# Patient Record
Sex: MALE | Race: WHITE | NOT HISPANIC OR LATINO | Employment: FULL TIME | URBAN - METROPOLITAN AREA
[De-identification: names, ages, dates, MRNs, and addresses within clinical notes are randomized per-mention and may not be internally consistent; named-entity substitution may affect disease eponyms.]

---

## 2017-07-19 ENCOUNTER — TELEPHONE (OUTPATIENT)
Dept: GASTROENTEROLOGY | Facility: CLINIC | Age: 32
End: 2017-07-19

## 2017-07-19 NOTE — TELEPHONE ENCOUNTER
----- Message from Latrice Lagos sent at 7/19/2017  2:14 PM CDT -----  Contact: Dr. Raudel Martins  Good afternoon, Dr. Martins would like to refer the following patient to Dr. Manley in the gastroenterology department. The patients diagnosis is abdominal pain, diarrhea, and family h/o of ulcerative colitis. I have scanned the patients referral and records into media manager. Please let me know if Dr. Manley will see this diagnosis. If there are any further questions in regards to the patient, please contact Dr. Martins's referral navigator, Kaur, at [phone]. Also, my extension is [phone]. Please let me know if I can help schedule in any way. Thank you!

## 2017-07-19 NOTE — TELEPHONE ENCOUNTER
Called and spoke with Kaur Martins's office  I asked if pt has a DX of UC or Crohn's.She said no she does not believe so it is a family history of UC. I explained Dr Manley generally only see's pts that have been DX with UC or Crohn's. I told her we do have general GI physicians in office and asked if that would be ok and she said she believes the reason they were referring was at pts request.  I told her I would reach out to the pt and she expressed understanding     After hanging up with Kaur, Manda stated she had already spoken with Latrice and told her to send it to us and we would review.  Will review records before calling pt

## 2017-10-04 ENCOUNTER — LAB VISIT (OUTPATIENT)
Dept: LAB | Facility: HOSPITAL | Age: 32
End: 2017-10-04
Attending: INTERNAL MEDICINE
Payer: COMMERCIAL

## 2017-10-04 ENCOUNTER — OFFICE VISIT (OUTPATIENT)
Dept: GASTROENTEROLOGY | Facility: CLINIC | Age: 32
End: 2017-10-04
Payer: COMMERCIAL

## 2017-10-04 VITALS
DIASTOLIC BLOOD PRESSURE: 86 MMHG | BODY MASS INDEX: 25.01 KG/M2 | SYSTOLIC BLOOD PRESSURE: 146 MMHG | HEIGHT: 73 IN | WEIGHT: 188.69 LBS | HEART RATE: 85 BPM

## 2017-10-04 DIAGNOSIS — G89.29 CHRONIC ABDOMINAL PAIN: ICD-10-CM

## 2017-10-04 DIAGNOSIS — G89.29 CHRONIC ABDOMINAL PAIN: Primary | ICD-10-CM

## 2017-10-04 DIAGNOSIS — R10.9 CHRONIC ABDOMINAL PAIN: ICD-10-CM

## 2017-10-04 DIAGNOSIS — R10.9 CHRONIC ABDOMINAL PAIN: Primary | ICD-10-CM

## 2017-10-04 LAB
ALBUMIN SERPL BCP-MCNC: 4.1 G/DL
ALP SERPL-CCNC: 79 U/L
ALT SERPL W/O P-5'-P-CCNC: 20 U/L
ANION GAP SERPL CALC-SCNC: 10 MMOL/L
AST SERPL-CCNC: 21 U/L
BASOPHILS # BLD AUTO: 0.07 K/UL
BASOPHILS NFR BLD: 1.2 %
BILIRUB SERPL-MCNC: 0.4 MG/DL
BUN SERPL-MCNC: 11 MG/DL
CALCIUM SERPL-MCNC: 9.6 MG/DL
CHLORIDE SERPL-SCNC: 104 MMOL/L
CO2 SERPL-SCNC: 27 MMOL/L
CREAT SERPL-MCNC: 1.1 MG/DL
CRP SERPL-MCNC: 0.5 MG/L
DIFFERENTIAL METHOD: NORMAL
EOSINOPHIL # BLD AUTO: 0.2 K/UL
EOSINOPHIL NFR BLD: 3.7 %
ERYTHROCYTE [DISTWIDTH] IN BLOOD BY AUTOMATED COUNT: 12 %
ERYTHROCYTE [SEDIMENTATION RATE] IN BLOOD BY WESTERGREN METHOD: 1 MM/HR
EST. GFR  (AFRICAN AMERICAN): >60 ML/MIN/1.73 M^2
EST. GFR  (NON AFRICAN AMERICAN): >60 ML/MIN/1.73 M^2
GLUCOSE SERPL-MCNC: 88 MG/DL
HCT VFR BLD AUTO: 45.2 %
HGB BLD-MCNC: 15.7 G/DL
LYMPHOCYTES # BLD AUTO: 1.7 K/UL
LYMPHOCYTES NFR BLD: 28.4 %
MCH RBC QN AUTO: 30.5 PG
MCHC RBC AUTO-ENTMCNC: 34.7 G/DL
MCV RBC AUTO: 88 FL
MONOCYTES # BLD AUTO: 0.6 K/UL
MONOCYTES NFR BLD: 9.6 %
NEUTROPHILS # BLD AUTO: 3.4 K/UL
NEUTROPHILS NFR BLD: 56.9 %
PLATELET # BLD AUTO: 234 K/UL
PMV BLD AUTO: 10.2 FL
POTASSIUM SERPL-SCNC: 4.3 MMOL/L
PROT SERPL-MCNC: 7.5 G/DL
RBC # BLD AUTO: 5.15 M/UL
SODIUM SERPL-SCNC: 141 MMOL/L
WBC # BLD AUTO: 5.92 K/UL

## 2017-10-04 PROCEDURE — 99205 OFFICE O/P NEW HI 60 MIN: CPT | Mod: S$GLB,,, | Performed by: INTERNAL MEDICINE

## 2017-10-04 PROCEDURE — 80053 COMPREHEN METABOLIC PANEL: CPT

## 2017-10-04 PROCEDURE — 85025 COMPLETE CBC W/AUTO DIFF WBC: CPT

## 2017-10-04 PROCEDURE — 99999 PR PBB SHADOW E&M-EST. PATIENT-LVL III: CPT | Mod: PBBFAC,,, | Performed by: INTERNAL MEDICINE

## 2017-10-04 PROCEDURE — 85651 RBC SED RATE NONAUTOMATED: CPT

## 2017-10-04 PROCEDURE — 83516 IMMUNOASSAY NONANTIBODY: CPT

## 2017-10-04 PROCEDURE — 36415 COLL VENOUS BLD VENIPUNCTURE: CPT

## 2017-10-04 PROCEDURE — 86140 C-REACTIVE PROTEIN: CPT

## 2017-10-04 RX ORDER — DIPHENOXYLATE HYDROCHLORIDE AND ATROPINE SULFATE 2.5; .025 MG/1; MG/1
1 TABLET ORAL
COMMUNITY

## 2017-10-04 RX ORDER — MONTELUKAST SODIUM 10 MG/1
10 TABLET ORAL
COMMUNITY

## 2017-10-04 RX ORDER — CLONAZEPAM 0.5 MG/1
0.5 TABLET ORAL DAILY
COMMUNITY

## 2017-10-04 RX ORDER — BUPROPION HYDROCHLORIDE 75 MG/1
75 TABLET ORAL 2 TIMES DAILY
COMMUNITY

## 2017-10-04 NOTE — LETTER
October 4, 2017      Raudel Martins,   [address] [place] [address] [place] [address]           [facility] - Gastroenterology  [address]  [place] [address]  Phone: [phone]  Fax: [phone]          Patient: Georgi Alfred   MR Number: [id number]   YOB: 1985   Date of Visit: 10/4/2017       Dear Dr. Raudel Martins:    Thank you for referring Georgi Alfred to me for evaluation. Attached you will find relevant portions of my assessment and plan of care.    If you have questions, please do not hesitate to call me. I look forward to following Georgi Alfred along with you.    Sincerely,    Valentin Robles MD    Enclosure  CC:  No Recipients    If you would like to receive this communication electronically, please contact [email or web address] or [phone] to request more information on [employer] Link access.    For providers and/or their staff who would like to refer a patient to [facility], please contact us through our one-stop-shop provider referral line, [facility], at [phone].    If you feel you have received this communication in error or would no longer like to receive these types of communications, please e-mail [email or web address]

## 2017-10-04 NOTE — PROGRESS NOTES
Subjective:       Patient ID: Georgi Alfred is a 31 y.o. male.    Chief Complaint: GI Problem    HPI  Review of Systems   Constitutional: Positive for fatigue. Negative for activity change, appetite change, chills, diaphoresis, fever and unexpected weight change.   HENT: Positive for congestion and mouth sores. Negative for ear pain, rhinorrhea, sinus pressure, sneezing, sore throat, trouble swallowing and voice change.    Eyes: Negative for pain.   Respiratory: Negative for cough and shortness of breath.    Cardiovascular: Negative for chest pain, palpitations and leg swelling.   Genitourinary: Negative for difficulty urinating and flank pain.   Musculoskeletal: Positive for back pain and myalgias. Negative for arthralgias, gait problem, joint swelling and neck pain.   Skin: Negative for rash.   Neurological: Negative for dizziness, tremors, syncope, numbness and headaches.   Hematological: Negative for adenopathy. Does not bruise/bleed easily.   Psychiatric/Behavioral: Positive for sleep disturbance. Negative for agitation, behavioral problems, confusion, decreased concentration and dysphoric mood.       Objective:      Physical Exam   Constitutional: He is oriented to person, place, and time. He appears well-developed and well-nourished. No distress.   HENT:   Right Ear: External ear normal.   Left Ear: External ear normal.   Nose: Nose normal.   Mouth/Throat: Oropharynx is clear and moist. No oropharyngeal exudate.   Eyes: Conjunctivae are normal. Pupils are equal, round, and reactive to light. No scleral icterus.   Neck: Normal range of motion. Neck supple. No thyromegaly present.   Cardiovascular: Normal rate, normal heart sounds and intact distal pulses.  Exam reveals no gallop.    No murmur heard.  Pulmonary/Chest: Effort normal and breath sounds normal. He has no wheezes.   Abdominal: Soft. Normal appearance and bowel sounds are normal. He exhibits no distension, no fluid wave, no ascites and no mass.  There is no hepatosplenomegaly. There is tenderness in the right lower quadrant. There is no rigidity, no rebound, no guarding and no CVA tenderness.       Genitourinary:   Genitourinary Comments: recent   Musculoskeletal: Normal range of motion. He exhibits no edema or tenderness.   Lymphadenopathy:     He has no cervical adenopathy.   Neurological: He is alert and oriented to person, place, and time. He has normal reflexes. No cranial nerve deficit.   Skin: Skin is warm. No rash noted. He is not diaphoretic.   Psychiatric: He has a normal mood and affect. His behavior is normal. Thought content normal.       Assessment:       1. Chronic abdominal pain        Plan:         HISTORY OF PRESENT ILLNESS:  Georgi Alfred is here with his wife.  They are   from [place].  They desired a second or third opinion.  It was certainly my   pleasure to see him today.  I was able to review a large volume of medical   records.    He says he has had stomach issues even as a teenager, but they were not severe   or incapacitating.  In 2010, he was diagnosed with anxiety and treated with   various ways, but over the past several years is when the abdominal symptoms   have increased.  He complains of abdominal pain.  This is a diffuse mid   abdominal pain, although there seems to be some concentration in the right lower   quadrant.  When it does start anywhere, it seems to be most focused there.  He   has it everyday, although the intensity is variable.  On bad days, he lies down   and cannot go to work.  It does seem to be progressive over time.  He is unable   to identify any specific foods or activities that make the pain worse.    Likewise, there are no things that make it better.  He does have off and   frequent gas and gas pain.  He often has diarrhea as well.  He does not have   diarrhea every day.  Some days, he will have no bowel movements.  It sounds like   most days, he will have loose stools.  Often, there is a lot of  urgency and   sometimes some incomplete evacuation.  He never has nocturnal diarrhea, but the   pain can wake him up at night.    REVIEW OF SYSTEMS:  Significant for fatigue and mouth ulcers.  The mouth ulcers   are particularly symptomatic.    PAST MEDICATION TRIALS:  1.  Levsin, no help, Librax that helped with the pain, but made anxiety worse.  2.  Lomotil, helps with diarrhea.  3.  Bentyl, no help.  4.  Pepto.  5.  Imodium.  6.  Peppermint oil.  7.  Probiotics.    PAST MEDICATION WORKUP:  1.  ESR of 69 in 2013.  I am not sure why this was drawn.  2.  Colonoscopy in June 2016, done by Dr. Scherer.  The exam was to the   cecum.  They did not visualize the terminal ileum.  The colon was reported   normal.  3.  EGD in June 2016, this was normal.  Duodenal biopsies were obtained.  I do   not have those results.  I am assuming they were normal.  4.  CT scan in 2015, essentially normal.    SOCIAL HISTORY:  .  He works as a .  Diet is variable.  He likes   to be active, but this has been limiting his activities like mountain biking and   kayaking.    ASSESSMENT AND DECISION MAKING:  Chronic abdominal pain.  I think this has been   diagnosed as irritable bowel syndrome and this may well be IBS-D.  However, I am   not completely satisfied that Crohn's disease has been excluded.  With the pain   that wakes him up at night, the fullness and tenderness in the right lower   quadrant and the mouth ulcers, that still concerns me that he might have small   bowel Crohn's disease.  I have recommended that we do labs today and then do a   CT enterography.  If there are any abnormalities after that, I will consider   either a colonoscopy with intubation of the terminal ileum and/or video capsule   to look at the rest of the small bowel.  We will try and start with noninvasive   tests early.  We did not talk about treatment of Crohn's disease at this time.    We did talk about other options for IBS.  If the Crohn's  workup is negative then   there are some options we can try for IBS.  We may try and switch his   antidepressive medicines.  He is not convinced Wellbutrin really helps him.  I   would like to use tricyclic agents in low-dose for IBS.  I wonder if Viberzi   might help him.  We could even pursue SIBO along the IBS lines.    RECOMMENDATIONS:  1.  Labs:  CBC, CMP, CRP, ESR and TTG.  2.  CT enterography, take from there.      REBECCA/IN  dd: 10/04/2017 16:46:20 (CDT)  td: 10/05/2017 13:19:01 (CDT)  Doc ID   #[id number]  Job ID #[id number]    CC:

## 2017-10-05 ENCOUNTER — TELEPHONE (OUTPATIENT)
Dept: GASTROENTEROLOGY | Facility: CLINIC | Age: 32
End: 2017-10-05

## 2017-10-06 LAB — TTG IGA SER IA-ACNC: 5 UNITS

## 2017-10-09 ENCOUNTER — TELEPHONE (OUTPATIENT)
Dept: GASTROENTEROLOGY | Facility: CLINIC | Age: 32
End: 2017-10-09

## 2017-10-09 NOTE — TELEPHONE ENCOUNTER
----- Message from Shruthi Hernandez sent at 10/9/2017  9:39 AM CDT -----  Contact: self - [phone]  Travis - has questions re his appt on Wednesday for his ct scan - please call patient at

## 2017-10-11 ENCOUNTER — HOSPITAL ENCOUNTER (OUTPATIENT)
Dept: RADIOLOGY | Facility: HOSPITAL | Age: 32
Discharge: HOME | End: 2017-10-11
Attending: INTERNAL MEDICINE
Payer: COMMERCIAL

## 2017-10-11 DIAGNOSIS — G89.29 CHRONIC ABDOMINAL PAIN: ICD-10-CM

## 2017-10-11 DIAGNOSIS — R10.9 CHRONIC ABDOMINAL PAIN: ICD-10-CM

## 2017-10-11 PROCEDURE — 74177 CT ABD & PELVIS W/CONTRAST: CPT | Mod: TC

## 2017-10-11 PROCEDURE — 74177 CT ABD & PELVIS W/CONTRAST: CPT | Mod: 26,,, | Performed by: RADIOLOGY

## 2017-10-11 PROCEDURE — 25500020 PHARM REV CODE 255: Performed by: INTERNAL MEDICINE

## 2017-10-11 RX ADMIN — IOHEXOL 125 ML: 350 INJECTION, SOLUTION INTRAVENOUS at 04:10

## 2017-10-12 DIAGNOSIS — R10.84 ABDOMINAL PAIN, GENERALIZED: Primary | ICD-10-CM

## 2017-10-13 ENCOUNTER — TELEPHONE (OUTPATIENT)
Dept: GASTROENTEROLOGY | Facility: CLINIC | Age: 32
End: 2017-10-13

## 2017-10-13 NOTE — TELEPHONE ENCOUNTER
----- Message from Valentin Robles MD sent at 10/13/2017 12:43 PM CDT -----  Contact: pt [phone]  I [facility] it to him on [facility]  The scan was mostly normal, but possibly there is mild inflammation in the small intestine  Therefore I recommended colonoscopy  ----- Message -----  From: Cecilia Santoyo MA  Sent: 10/13/2017  12:24 PM  To: Valentin Robles MD        ----- Message -----  From: Shannan Prabhakar  Sent: 10/13/2017  12:03 PM  To: Travis Robles    Pt is calling for test results and the next course of action. Please call pt

## 2017-10-13 NOTE — TELEPHONE ENCOUNTER
Patient aware and expressed understanding.  Number given to patient for the endoscopy schedulers to call and set up the colon.

## 2017-10-17 ENCOUNTER — TELEPHONE (OUTPATIENT)
Dept: ENDOSCOPY | Facility: HOSPITAL | Age: 32
End: 2017-10-17

## 2017-10-17 DIAGNOSIS — Z12.11 SPECIAL SCREENING FOR MALIGNANT NEOPLASMS, COLON: Primary | ICD-10-CM

## 2017-10-17 RX ORDER — POLYETHYLENE GLYCOL 3350, SODIUM SULFATE ANHYDROUS, SODIUM BICARBONATE, SODIUM CHLORIDE, POTASSIUM CHLORIDE 236; 22.74; 6.74; 5.86; 2.97 G/4L; G/4L; G/4L; G/4L; G/4L
4 POWDER, FOR SOLUTION ORAL ONCE
Qty: 4000 ML | Refills: 0 | Status: SHIPPED | OUTPATIENT
Start: 2017-10-17 | End: 2017-10-17

## 2017-10-26 ENCOUNTER — SURGERY (OUTPATIENT)
Age: 32
End: 2017-10-26

## 2017-10-26 ENCOUNTER — HOSPITAL ENCOUNTER (OUTPATIENT)
Facility: HOSPITAL | Age: 32
Discharge: HOME | End: 2017-10-26
Attending: INTERNAL MEDICINE | Admitting: INTERNAL MEDICINE
Payer: COMMERCIAL

## 2017-10-26 ENCOUNTER — ANESTHESIA EVENT (OUTPATIENT)
Dept: ENDOSCOPY | Facility: HOSPITAL | Age: 32
End: 2017-10-26
Payer: COMMERCIAL

## 2017-10-26 ENCOUNTER — ANESTHESIA (OUTPATIENT)
Dept: ENDOSCOPY | Facility: HOSPITAL | Age: 32
End: 2017-10-26
Payer: COMMERCIAL

## 2017-10-26 VITALS
OXYGEN SATURATION: 97 % | DIASTOLIC BLOOD PRESSURE: 72 MMHG | HEART RATE: 69 BPM | TEMPERATURE: 98 F | SYSTOLIC BLOOD PRESSURE: 113 MMHG | RESPIRATION RATE: 18 BRPM | WEIGHT: 188 LBS | HEIGHT: 73 IN | BODY MASS INDEX: 24.92 KG/M2

## 2017-10-26 DIAGNOSIS — R10.84 ABDOMINAL PAIN, GENERALIZED: Primary | ICD-10-CM

## 2017-10-26 PROCEDURE — 88305 TISSUE EXAM BY PATHOLOGIST: CPT | Performed by: PATHOLOGY

## 2017-10-26 PROCEDURE — 27201012 HC FORCEPS, HOT/COLD, DISP: Performed by: INTERNAL MEDICINE

## 2017-10-26 PROCEDURE — 88305 TISSUE EXAM BY PATHOLOGIST: CPT | Mod: 26,,, | Performed by: PATHOLOGY

## 2017-10-26 PROCEDURE — 25000003 PHARM REV CODE 250: Performed by: INTERNAL MEDICINE

## 2017-10-26 PROCEDURE — 37000009 HC ANESTHESIA EA ADD 15 MINS: Performed by: INTERNAL MEDICINE

## 2017-10-26 PROCEDURE — 63600175 PHARM REV CODE 636 W HCPCS: Performed by: NURSE ANESTHETIST, CERTIFIED REGISTERED

## 2017-10-26 PROCEDURE — 37000008 HC ANESTHESIA 1ST 15 MINUTES: Performed by: INTERNAL MEDICINE

## 2017-10-26 PROCEDURE — 45380 COLONOSCOPY AND BIOPSY: CPT | Performed by: INTERNAL MEDICINE

## 2017-10-26 PROCEDURE — 25000003 PHARM REV CODE 250: Performed by: NURSE ANESTHETIST, CERTIFIED REGISTERED

## 2017-10-26 PROCEDURE — D9220A PRA ANESTHESIA: Mod: CRNA,,, | Performed by: NURSE ANESTHETIST, CERTIFIED REGISTERED

## 2017-10-26 PROCEDURE — D9220A PRA ANESTHESIA: Mod: ANES,,, | Performed by: ANESTHESIOLOGY

## 2017-10-26 PROCEDURE — 45380 COLONOSCOPY AND BIOPSY: CPT | Mod: ,,, | Performed by: INTERNAL MEDICINE

## 2017-10-26 RX ORDER — SODIUM CHLORIDE 9 MG/ML
INJECTION, SOLUTION INTRAVENOUS CONTINUOUS
Status: CANCELLED | OUTPATIENT
Start: 2017-10-26

## 2017-10-26 RX ORDER — PROPOFOL 10 MG/ML
VIAL (ML) INTRAVENOUS
Status: DISCONTINUED | OUTPATIENT
Start: 2017-10-26 | End: 2017-10-26

## 2017-10-26 RX ORDER — LIDOCAINE HCL/PF 100 MG/5ML
SYRINGE (ML) INTRAVENOUS
Status: DISCONTINUED | OUTPATIENT
Start: 2017-10-26 | End: 2017-10-26

## 2017-10-26 RX ORDER — SODIUM CHLORIDE 9 MG/ML
INJECTION, SOLUTION INTRAVENOUS CONTINUOUS
Status: DISCONTINUED | OUTPATIENT
Start: 2017-10-26 | End: 2017-10-26 | Stop reason: HOSPADM

## 2017-10-26 RX ORDER — GLYCOPYRROLATE 0.2 MG/ML
INJECTION INTRAMUSCULAR; INTRAVENOUS
Status: DISCONTINUED | OUTPATIENT
Start: 2017-10-26 | End: 2017-10-26

## 2017-10-26 RX ADMIN — PROPOFOL 30 MG: 10 INJECTION, EMULSION INTRAVENOUS at 01:10

## 2017-10-26 RX ADMIN — PROPOFOL 100 MG: 10 INJECTION, EMULSION INTRAVENOUS at 01:10

## 2017-10-26 RX ADMIN — SODIUM CHLORIDE: 900 INJECTION, SOLUTION INTRAVENOUS at 01:10

## 2017-10-26 RX ADMIN — GLYCOPYRROLATE 0.1 MG: 0.2 INJECTION, SOLUTION INTRAMUSCULAR; INTRAVENOUS at 01:10

## 2017-10-26 RX ADMIN — PROPOFOL 50 MG: 10 INJECTION, EMULSION INTRAVENOUS at 01:10

## 2017-10-26 RX ADMIN — PROPOFOL 30 MG: 10 INJECTION, EMULSION INTRAVENOUS at 02:10

## 2017-10-26 RX ADMIN — LIDOCAINE HYDROCHLORIDE 100 MG: 20 INJECTION, SOLUTION INTRAVENOUS at 01:10

## 2017-10-26 NOTE — TRANSFER OF CARE
"Anesthesia Transfer of Care Note    Patient: Georgi Alfred    Procedure(s) Performed: Procedure(s) (LRB):  COLONOSCOPY (N/A)    Patient location: PACU    Anesthesia Type: general    Transport from OR: Transported from OR on room air with adequate spontaneous ventilation    Post pain: adequate analgesia    Post assessment: no apparent anesthetic complications    Post vital signs: stable    Level of consciousness: sedated    Nausea/Vomiting: no nausea/vomiting    Complications: none    Transfer of care protocol was followed      Last vitals:   Visit Vitals  BP (!) 144/87 (BP Location: Left arm, Patient Position: Lying)   Pulse 89   Temp 36.8 °C (98.2 °F) (Temporal)   Resp 18   Ht 6' 1" (1.854 m)   Wt 85.3 kg (188 lb)   SpO2 100%   BMI 24.80 kg/m²     "

## 2017-10-26 NOTE — PATIENT INSTRUCTIONS
Discharge Summary/Instructions after an Endoscopic Procedure  Patient Name: Georgi Alfred  Patient MRN: [id number]  Patient YOB: 1985 Thursday, October 26, 2017  Valentin Robles MD  RESTRICTIONS:  During your procedure today, you received medications for sedation.  These   medications may affect your judgment, balance and coordination.  Therefore,   for 24 hours, you have the following restrictions:   - DO NOT drive a car, operate machinery, make legal/financial decisions,   sign important papers or drink alcohol.    ACTIVITY:  The following day: return to full activity including work, except no heavy   lifting, straining or running for 3 days if polyps were removed.  DIET:  Eat and drink normally unless instructed otherwise.     TREATMENT FOR COMMON SIDE EFFECTS:  - Mild abdominal pain, belching, bloating or excessive gas: rest, eat   lightly and use a heating pad.  - Sore Throat: treat with throat lozenges and/or gargle with warm salt   water.  SYMPTOMS TO WATCH FOR AND REPORT TO YOUR PHYSICIAN:  1. Abdominal pain or bloating, other than gas cramps.  2. Chest pain.  3. Back pain.  4. Chills or fever occurring within 24 hours after the procedure.  5. Rectal bleeding, which would show as bright red, maroon, or black stools.   (A tablespoon of blood from the rectum is not serious, especially if   hemorrhoids are present.)  6. Vomiting.  7. Weakness or dizziness.  8. Because air was used during the procedure, expelling large amounts of air   from your rectum or belching is normal.  9. If a bowel prep was taken, you may not have a bowel movement for 1-3   days.  This is normal.  GO DIRECTLY TO THE NEAREST EMERGENCY ROOM IF YOU HAVE ANY OF THE FOLLOWING:      Difficulty breathing  Chills and/or fever over 101 F   Persistent vomiting and/or vomiting blood   Severe abdominal pain   Severe chest pain   Black, tarry stools   Bleeding- more than one tablespoon  Your doctor recommends these additional  instructions:  If any biopsies were taken, your doctors clinic will contact you in 1 to 2   weeks with any results.  You have a contact number available for emergencies.  The signs and symptoms   of potential delayed complications were discussed with you.  You may return   to normal activities tomorrow.  Written discharge instructions were   provided to you.   You are being discharged to home.   Resume your previous diet.   Continue your present medications.   We are waiting for your pathology results.   Return to your GI clinic at appointment to be scheduled.  For questions, problems or results please call your physician - Valentin Robles MD at Work:  [phone].  [facility] [place], EMERGENCY ROOM PHONE NUMBER: [phone]  IF A COMPLICATION OR EMERGENCY SITUATION ARISES AND YOU ARE UNABLE TO REACH   YOUR PHYSICIAN - GO DIRECTLY TO THE EMERGENCY ROOM.  Valentin Robles MD  10/26/2017 2:09:31 PM  This report has been verified and signed electronically.

## 2017-10-26 NOTE — H&P (VIEW-ONLY)
Subjective:       Patient ID: Georgi Alfred is a 31 y.o. male.    Chief Complaint: GI Problem    HPI  Review of Systems   Constitutional: Positive for fatigue. Negative for activity change, appetite change, chills, diaphoresis, fever and unexpected weight change.   HENT: Positive for congestion and mouth sores. Negative for ear pain, rhinorrhea, sinus pressure, sneezing, sore throat, trouble swallowing and voice change.    Eyes: Negative for pain.   Respiratory: Negative for cough and shortness of breath.    Cardiovascular: Negative for chest pain, palpitations and leg swelling.   Genitourinary: Negative for difficulty urinating and flank pain.   Musculoskeletal: Positive for back pain and myalgias. Negative for arthralgias, gait problem, joint swelling and neck pain.   Skin: Negative for rash.   Neurological: Negative for dizziness, tremors, syncope, numbness and headaches.   Hematological: Negative for adenopathy. Does not bruise/bleed easily.   Psychiatric/Behavioral: Positive for sleep disturbance. Negative for agitation, behavioral problems, confusion, decreased concentration and dysphoric mood.       Objective:      Physical Exam   Constitutional: He is oriented to person, place, and time. He appears well-developed and well-nourished. No distress.   HENT:   Right Ear: External ear normal.   Left Ear: External ear normal.   Nose: Nose normal.   Mouth/Throat: Oropharynx is clear and moist. No oropharyngeal exudate.   Eyes: Conjunctivae are normal. Pupils are equal, round, and reactive to light. No scleral icterus.   Neck: Normal range of motion. Neck supple. No thyromegaly present.   Cardiovascular: Normal rate, normal heart sounds and intact distal pulses.  Exam reveals no gallop.    No murmur heard.  Pulmonary/Chest: Effort normal and breath sounds normal. He has no wheezes.   Abdominal: Soft. Normal appearance and bowel sounds are normal. He exhibits no distension, no fluid wave, no ascites and no mass.  There is no hepatosplenomegaly. There is tenderness in the right lower quadrant. There is no rigidity, no rebound, no guarding and no CVA tenderness.       Genitourinary:   Genitourinary Comments: recent   Musculoskeletal: Normal range of motion. He exhibits no edema or tenderness.   Lymphadenopathy:     He has no cervical adenopathy.   Neurological: He is alert and oriented to person, place, and time. He has normal reflexes. No cranial nerve deficit.   Skin: Skin is warm. No rash noted. He is not diaphoretic.   Psychiatric: He has a normal mood and affect. His behavior is normal. Thought content normal.       Assessment:       1. Chronic abdominal pain        Plan:         HISTORY OF PRESENT ILLNESS:  Georgi Alfred is here with his wife.  They are   from [place].  They desired a second or third opinion.  It was certainly my   pleasure to see him today.  I was able to review a large volume of medical   records.    He says he has had stomach issues even as a teenager, but they were not severe   or incapacitating.  In 2010, he was diagnosed with anxiety and treated with   various ways, but over the past several years is when the abdominal symptoms   have increased.  He complains of abdominal pain.  This is a diffuse mid   abdominal pain, although there seems to be some concentration in the right lower   quadrant.  When it does start anywhere, it seems to be most focused there.  He   has it everyday, although the intensity is variable.  On bad days, he lies down   and cannot go to work.  It does seem to be progressive over time.  He is unable   to identify any specific foods or activities that make the pain worse.    Likewise, there are no things that make it better.  He does have off and   frequent gas and gas pain.  He often has diarrhea as well.  He does not have   diarrhea every day.  Some days, he will have no bowel movements.  It sounds like   most days, he will have loose stools.  Often, there is a lot of  urgency and   sometimes some incomplete evacuation.  He never has nocturnal diarrhea, but the   pain can wake him up at night.    REVIEW OF SYSTEMS:  Significant for fatigue and mouth ulcers.  The mouth ulcers   are particularly symptomatic.    PAST MEDICATION TRIALS:  1.  Levsin, no help, Librax that helped with the pain, but made anxiety worse.  2.  Lomotil, helps with diarrhea.  3.  Bentyl, no help.  4.  Pepto.  5.  Imodium.  6.  Peppermint oil.  7.  Probiotics.    PAST MEDICATION WORKUP:  1.  ESR of 69 in 2013.  I am not sure why this was drawn.  2.  Colonoscopy in June 2016, done by Dr. Scherer.  The exam was to the   cecum.  They did not visualize the terminal ileum.  The colon was reported   normal.  3.  EGD in June 2016, this was normal.  Duodenal biopsies were obtained.  I do   not have those results.  I am assuming they were normal.  4.  CT scan in 2015, essentially normal.    SOCIAL HISTORY:  .  He works as a .  Diet is variable.  He likes   to be active, but this has been limiting his activities like mountain biking and   kayaking.    ASSESSMENT AND DECISION MAKING:  Chronic abdominal pain.  I think this has been   diagnosed as irritable bowel syndrome and this may well be IBS-D.  However, I am   not completely satisfied that Crohn's disease has been excluded.  With the pain   that wakes him up at night, the fullness and tenderness in the right lower   quadrant and the mouth ulcers, that still concerns me that he might have small   bowel Crohn's disease.  I have recommended that we do labs today and then do a   CT enterography.  If there are any abnormalities after that, I will consider   either a colonoscopy with intubation of the terminal ileum and/or video capsule   to look at the rest of the small bowel.  We will try and start with noninvasive   tests early.  We did not talk about treatment of Crohn's disease at this time.    We did talk about other options for IBS.  If the Crohn's  workup is negative then   there are some options we can try for IBS.  We may try and switch his   antidepressive medicines.  He is not convinced Wellbutrin really helps him.  I   would like to use tricyclic agents in low-dose for IBS.  I wonder if Viberzi   might help him.  We could even pursue SIBO along the IBS lines.    RECOMMENDATIONS:  1.  Labs:  CBC, CMP, CRP, ESR and TTG.  2.  CT enterography, take from there.      REBECCA/IN  dd: 10/04/2017 16:46:20 (CDT)  td: 10/05/2017 13:19:01 (CDT)  Doc ID   #[id number]  Job ID #[id number]    CC:

## 2017-10-26 NOTE — ANESTHESIA POSTPROCEDURE EVALUATION
"Anesthesia Post Evaluation    Patient: Georgi Alfred    Procedure(s) Performed: Procedure(s) (LRB):  COLONOSCOPY (N/A)    Final Anesthesia Type: general  Patient location during evaluation: PACU  Patient participation: Yes- Able to Participate  Level of consciousness: awake and alert and oriented  Post-procedure vital signs: reviewed and stable  Pain management: adequate  Airway patency: patent  PONV status at discharge: No PONV  Anesthetic complications: no      Cardiovascular status: stable  Respiratory status: unassisted  Hydration status: euvolemic  Follow-up not needed.        Visit Vitals  /72 (BP Location: Left arm, Patient Position: Lying)   Pulse 69   Temp 36.8 °C (98.2 °F) (Temporal)   Resp 18   Ht 6' 1" (1.854 m)   Wt 85.3 kg (188 lb)   SpO2 97%   BMI 24.80 kg/m²       Pain/Maria Esther Score: Pain Assessment Performed: Yes (10/26/2017  2:42 PM)  Presence of Pain: denies (10/26/2017  2:42 PM)  Maria Esther Score: 10 (10/26/2017  2:42 PM)      "

## 2017-10-26 NOTE — INTERVAL H&P NOTE
The patient has been examined and the H&P has been reviewed:    I concur with the findings and no changes have occurred since H&P was written.    Anesthesia/Surgery risks, benefits and alternative options discussed and understood by patient/family.          Active Hospital Problems    Diagnosis  POA    Abdominal pain, generalized [R10.84]  Yes      Resolved Hospital Problems    Diagnosis Date Resolved POA   No resolved problems to display.

## 2017-10-26 NOTE — ANESTHESIA PREPROCEDURE EVALUATION
10/26/2017  Georgi Alfred is a 31 y.o., male.    Anesthesia Evaluation    I have reviewed the Patient Summary Reports.    I have reviewed the Nursing Notes.   I have reviewed the Medications.     Review of Systems      Physical Exam  General:  Well nourished    Airway/Jaw/Neck:  Airway Findings: Mouth Opening: Normal Tongue: Normal  General Airway Assessment: Average  Mallampati: I  Improves to I with phonation.  TM Distance: Normal, at least 6 cm  Jaw/Neck Findings:  Neck ROM: Normal ROM            Mental Status:  Mental Status Findings:  Alert and Oriented         Anesthesia Plan  Type of Anesthesia, risks & benefits discussed:  Anesthesia Type:  general, MAC  Patient's Preference:   Intra-op Monitoring Plan: standard ASA monitors  Intra-op Monitoring Plan Comments:   Post Op Pain Control Plan:   Post Op Pain Control Plan Comments:   Induction:   IV  Beta Blocker:  Patient is not currently on a Beta-Blocker (No further documentation required).       Informed Consent: Patient understands risks and agrees with Anesthesia plan.  Questions answered. Anesthesia consent signed with patient.  ASA Score: 2     Day of Surgery Review of History & Physical:    H&P update referred to the surgeon.         Ready For Surgery From Anesthesia Perspective.

## 2017-10-31 ENCOUNTER — TELEPHONE (OUTPATIENT)
Dept: GASTROENTEROLOGY | Facility: CLINIC | Age: 32
End: 2017-10-31

## 2017-10-31 NOTE — TELEPHONE ENCOUNTER
----- Message from Valentin Robles MD sent at 10/31/2017  2:57 PM CDT -----  Please call, the biopsy was fine. My next plan was to see if he might benefit from a low dose of elavil, which I use for irritable bowel.'  If he can get iff the welbutrin, I could start that  And he needs f/u 4 to 6 week with NP

## 2017-11-02 ENCOUNTER — TELEPHONE (OUTPATIENT)
Dept: ENDOSCOPY | Facility: HOSPITAL | Age: 32
End: 2017-11-02

## 2017-12-12 ENCOUNTER — TELEPHONE (OUTPATIENT)
Dept: GASTROENTEROLOGY | Facility: CLINIC | Age: 32
End: 2017-12-12

## 2017-12-12 NOTE — TELEPHONE ENCOUNTER
----- Message from Kimberly Booker MA sent at 12/12/2017  9:16 AM CST -----  Contact: self  [phone]  States he is returning your call.

## 2022-05-25 NOTE — TELEPHONE ENCOUNTER
----- Message from Cedrick Lin sent at 12/12/2017  8:23 AM CST -----  Contact: pt   Pt would like a call back regarding scheduling post op appoint,ent       Pt can be reached at [phone]   Quality 110: Preventive Care And Screening: Influenza Immunization: Influenza Immunization not Administered because Patient Refused. Detail Level: Detailed